# Patient Record
Sex: FEMALE | URBAN - METROPOLITAN AREA
[De-identification: names, ages, dates, MRNs, and addresses within clinical notes are randomized per-mention and may not be internally consistent; named-entity substitution may affect disease eponyms.]

---

## 2023-04-06 ENCOUNTER — DOCUMENTATION (OUTPATIENT)
Dept: OCCUPATIONAL MEDICINE | Facility: CLINIC | Age: 38
End: 2023-04-06

## 2023-04-07 NOTE — PROGRESS NOTES
Pt has an appointment for Pre-Employment Physical with [facility] on 04/13/2023.    Currently missing the following vaccine documentation:    Tdap  COVID-19  Varicella  MMR  Hep B  Flu Shot    Contacted via email on 04/06/23, requesting missing documentation. If unable to obtain by the appointment  date, lab titers will be drawn, and/or vaccines will be given.